# Patient Record
Sex: FEMALE | Race: WHITE | Employment: STUDENT | URBAN - METROPOLITAN AREA
[De-identification: names, ages, dates, MRNs, and addresses within clinical notes are randomized per-mention and may not be internally consistent; named-entity substitution may affect disease eponyms.]

---

## 2017-06-29 ENCOUNTER — TELEPHONE (OUTPATIENT)
Dept: OBGYN CLINIC | Age: 37
End: 2017-06-29

## 2017-06-29 RX ORDER — LEVONORGESTREL AND ETHINYL ESTRADIOL 0.1-0.02MG
1 KIT ORAL DAILY
Qty: 84 TAB | Refills: 0 | Status: SHIPPED | OUTPATIENT
Start: 2017-06-29 | End: 2017-10-16 | Stop reason: SDUPTHER

## 2017-07-06 ENCOUNTER — TELEPHONE (OUTPATIENT)
Dept: OBGYN CLINIC | Age: 37
End: 2017-07-06

## 2017-07-06 NOTE — TELEPHONE ENCOUNTER
Patient calling stating that she stopped her OCP's x1 week ago as she wanted to take a break and see if she could loose weight. Last night, she had IC and the condom slipped off and semen spilled everywhere and patient was concerned that some may have spilled into the vagina and took plan B today. Patient has decided that she wants to go back on the OCP's. Please advise when patient should start.

## 2017-07-28 ENCOUNTER — OFFICE VISIT (OUTPATIENT)
Dept: FAMILY MEDICINE CLINIC | Age: 37
End: 2017-07-28

## 2017-07-28 DIAGNOSIS — Z11.1 ENCOUNTER FOR PPD TEST: Primary | ICD-10-CM

## 2017-07-28 NOTE — PROGRESS NOTES
PPD 0.1ml placed in left forearm for employment requirement. Will return in 3 days for reading. Pt states understanding.   Verbal order given by Radha Kuhn NP.

## 2017-07-31 ENCOUNTER — OFFICE VISIT (OUTPATIENT)
Dept: FAMILY MEDICINE CLINIC | Age: 37
End: 2017-07-31

## 2017-07-31 DIAGNOSIS — Z11.1 SCREENING-PULMONARY TB: Primary | ICD-10-CM

## 2017-07-31 LAB
MM INDURATION POC: 0 MM (ref 0–5)
PPD POC: NORMAL NEGATIVE

## 2017-08-11 ENCOUNTER — OFFICE VISIT (OUTPATIENT)
Dept: FAMILY MEDICINE CLINIC | Age: 37
End: 2017-08-11

## 2017-08-11 VITALS
WEIGHT: 177 LBS | SYSTOLIC BLOOD PRESSURE: 118 MMHG | BODY MASS INDEX: 27.78 KG/M2 | DIASTOLIC BLOOD PRESSURE: 74 MMHG | HEIGHT: 67 IN | TEMPERATURE: 98 F | HEART RATE: 72 BPM | RESPIRATION RATE: 16 BRPM | OXYGEN SATURATION: 99 %

## 2017-08-11 DIAGNOSIS — M25.50 ARTHRALGIA, UNSPECIFIED JOINT: Primary | ICD-10-CM

## 2017-08-11 RX ORDER — PREDNISONE 20 MG/1
20 TABLET ORAL 2 TIMES DAILY
Qty: 20 TAB | Refills: 0 | Status: SHIPPED | OUTPATIENT
Start: 2017-08-11 | End: 2017-08-25 | Stop reason: SDUPTHER

## 2017-08-11 NOTE — PROGRESS NOTES
HISTORY OF PRESENT ILLNESS  Aneta Oviedo is a 30 y.o. female. HPI  C/O worsening joint pains. Last seen Jan 2016 and at that time referred to Rheumatology. She was not able to go to appt due to son having concussion. CRP was elevated but RF and JONH negative then. Pain quieted down for a while but worsened in last 6 months. Now involving wrists, elbows, as well as knees, hip, back, shoulders. She takes  mg three to four times daily to move. Right wrist is swollen. She is finishing up nursing school. Review of Systems   Gastrointestinal: Positive for abdominal pain. Musculoskeletal: Positive for back pain and joint pain. All other systems reviewed and are negative. Visit Vitals    /74 (BP 1 Location: Left arm, BP Patient Position: Sitting)    Pulse 72    Temp 98 °F (36.7 °C) (Oral)    Resp 16    Ht 5' 7\" (1.702 m)    Wt 177 lb (80.3 kg)    LMP 07/20/2017    SpO2 99%    BMI 27.72 kg/m2       Physical Exam   Constitutional: She appears well-developed and well-nourished. Pulmonary/Chest:       Left breast axillary tail, no mass. Mild tender. No adenopathy. Musculoskeletal:        Right wrist: She exhibits tenderness and swelling. Vitals reviewed. ASSESSMENT and PLAN    ICD-10-CM ICD-9-CM    1. Arthralgia, unspecified joint M25.50 719.40 C REACTIVE PROTEIN, QT      REFERRAL TO RHEUMATOLOGY      predniSONE (DELTASONE) 20 mg tablet      SED RATE (ESR)      CBC WITH AUTOMATED DIFF      LYME AB, IGG & IGM BY WB      RHEUMATOID FACTOR, QL      JONH QL, W/REFLEX CASCADE     Recheck labs. Trial of prednisone. Warning about possible side effects. Need Rheumatology evaluation. Suggest taking Zantac or Prilosec OTC for stomach protection. Patient Instructions        Oral Corticosteroids: Care Instructions  Your Care Instructions  Oral corticosteroids are commonly used medicines. They help calm down the body's response to inflammation.  Oral means that they are taken by mouth. This is most often in the form of a pill. They are used for treating many conditions. You may take them for asthma, COPD, back pain, or allergic reactions. They are also used for other conditions such as autoimmune diseases and certain types of cancer. You may have side effects from taking this medicine. These include nausea, headache, dizziness, and anxiety. Pregnant women should not take this medicine unless their doctor tells them to. Follow your doctor's instructions on how to take this medicine. If you are taking it for 2 weeks or more, your doctor may give you special instructions to slowly reduce (taper) the amount you take. Slowly cutting down on the medicine over time helps your body adjust to the change. Follow-up care is a key part of your treatment and safety. Be sure to make and go to all appointments, and call your doctor if you are having problems. It's also a good idea to know your test results and keep a list of the medicines you take. How can you care for yourself at home? · Be safe with medicines. Take your medicines exactly as prescribed. Call your doctor if you think you are having a problem with your medicine. You will get more details on the specific medicines your doctor prescribes. · Take your medicine after a meal. It may cause nausea if you take it on an empty stomach. · Avoid taking nonsteroidal anti-inflammatory drugs (NSAIDs) while you are taking oral corticosteroids. Taking both of these medicines might cause an upset stomach. NSAIDs include ibuprofen (Advil, Motrin) and naproxen (Aleve). · If you have a history of stomach ulcers, you may want to avoid taking this medicine and an NSAID at the same time. This can cause stomach upset or bleeding. · Follow your doctor's instructions for how to stop taking this medicine. You may need to taper it. This means the medicine should be slowly reduced. Do not stop taking the medicine all at once.   When should you call for help?  Call 911 if:  · You vomit blood or what looks like coffee grounds. Call your doctor now or seek immediate medical care if:  · Your symptoms are getting worse. · You are dizzy or lightheaded, or you feel like you may faint. · You have new or worse nausea or vomiting. · You have stomach pain that is getting worse. · Your stools are black. Watch closely for changes in your health, and be sure to contact your doctor if:  · You do not get better as expected. Where can you learn more? Go to http://jazmín-andres.info/. Enter Y480 in the search box to learn more about \"Oral Corticosteroids: Care Instructions. \"  Current as of: March 25, 2017  Content Version: 11.3  © 2233-8136 [employer]. Care instructions adapted under license by [employer] (which disclaims liability or warranty for this information). If you have questions about a medical condition or this instruction, always ask your healthcare professional. [place] [address] any warranty or liability for your use of this information.

## 2017-08-11 NOTE — PATIENT INSTRUCTIONS
Oral Corticosteroids: Care Instructions  Your Care Instructions  Oral corticosteroids are commonly used medicines. They help calm down the body's response to inflammation. Oral means that they are taken by mouth. This is most often in the form of a pill. They are used for treating many conditions. You may take them for asthma, COPD, back pain, or allergic reactions. They are also used for other conditions such as autoimmune diseases and certain types of cancer. You may have side effects from taking this medicine. These include nausea, headache, dizziness, and anxiety. Pregnant women should not take this medicine unless their doctor tells them to. Follow your doctor's instructions on how to take this medicine. If you are taking it for 2 weeks or more, your doctor may give you special instructions to slowly reduce (taper) the amount you take. Slowly cutting down on the medicine over time helps your body adjust to the change. Follow-up care is a key part of your treatment and safety. Be sure to make and go to all appointments, and call your doctor if you are having problems. It's also a good idea to know your test results and keep a list of the medicines you take. How can you care for yourself at home? · Be safe with medicines. Take your medicines exactly as prescribed. Call your doctor if you think you are having a problem with your medicine. You will get more details on the specific medicines your doctor prescribes. · Take your medicine after a meal. It may cause nausea if you take it on an empty stomach. · Avoid taking nonsteroidal anti-inflammatory drugs (NSAIDs) while you are taking oral corticosteroids. Taking both of these medicines might cause an upset stomach. NSAIDs include ibuprofen (Advil, Motrin) and naproxen (Aleve). · If you have a history of stomach ulcers, you may want to avoid taking this medicine and an NSAID at the same time. This can cause stomach upset or bleeding.   · Follow your doctor's instructions for how to stop taking this medicine. You may need to taper it. This means the medicine should be slowly reduced. Do not stop taking the medicine all at once. When should you call for help? Call 911 if:  · You vomit blood or what looks like coffee grounds. Call your doctor now or seek immediate medical care if:  · Your symptoms are getting worse. · You are dizzy or lightheaded, or you feel like you may faint. · You have new or worse nausea or vomiting. · You have stomach pain that is getting worse. · Your stools are black. Watch closely for changes in your health, and be sure to contact your doctor if:  · You do not get better as expected. Where can you learn more? Go to http://jazmín-andres.info/. Enter H518 in the search box to learn more about \"Oral Corticosteroids: Care Instructions. \"  Current as of: March 25, 2017  Content Version: 11.3  © 1144-2919 [employer]. Care instructions adapted under license by [employer] (which disclaims liability or warranty for this information). If you have questions about a medical condition or this instruction, always ask your healthcare professional. [place] [address] any warranty or liability for your use of this information.

## 2017-08-11 NOTE — PROGRESS NOTES
Chief Complaint   Patient presents with    Pain (Chronic)     pt states she has pain all over body. now has moved into right wrist.  never really goes away     \"REVIEWED RECORD IN PREPARATION FOR VISIT AND HAVE OBTAINED THE NECESSARY DOCUMENTATION\"  1. Have you been to the ER, urgent care clinic since your last visit? Hospitalized since your last visit? Yes Where: seen at [facility] for swollen lymph nodes    2. Have you seen or consulted any other health care providers outside of the [address] [place] [address] since your last visit? Include any pap smears or colon screening. No  Patient does not have advanced directives.

## 2017-08-11 NOTE — MR AVS SNAPSHOT
Visit Information Date & Time Provider Department Dept. Phone Encounter #  
 1/53/6500  3:14 AM Kanwal MariaOctavio [phone] [id number] Your Appointments 8/23/2017  2:20 PM  
MAMMOGRAPHY with MAMMOGRAM, MANISHA Boyle ([address]) Appt Note: AE/MAMMO HW pt  
 [address] [place] [address]  
[phone]  
  
   
 [address]  
  
    
 8/23/2017  2:40 PM  
ESTABLISHED PATIENT with MD Frederick Wyatt [place] ([address]) Appt Note: AE/MAMMO HW pt  
 [address] [facility] [address]  
[facility] [address] Upcoming Health Maintenance Date Due DTaP/Tdap/Td series (1 - Tdap) 1/17/2003 INFLUENZA AGE 9 TO ADULT 8/1/2017 PAP AKA CERVICAL CYTOLOGY 7/1/2019 Allergies as of 8/11/2017  Review Complete On: 3/27/9942 By: Kanwal Maria MD  
  
 Severity Noted Reaction Type Reactions Doxycycline  08/13/2015   Side Effect Other (comments) esophagitis Iodinated Contrast- Oral And Iv Dye  04/03/2014    Unknown (comments) Pt states she passed out. Morphine  04/03/2014    Rash, Itching Tramadol  04/03/2014    Rash, Itching Current Immunizations  Reviewed on 7/28/2017 Name Date  
 TB Skin Test (PPD) Intradermal 7/28/2017  9:33 AM, 7/25/2016, 7/13/2016, 8/24/2015, 8/14/2015 Not reviewed this visit You Were Diagnosed With   
  
 Codes Comments Arthralgia, unspecified joint    -  Primary ICD-10-CM: M25.50 ICD-9-CM: 719.40 Vitals BP Pulse Temp Resp Height(growth percentile) Weight(growth percentile) 118/74 (BP 1 Location: Left arm, BP Patient Position: Sitting) 72 98 °F (36.7 °C) (Oral) 16 5' 7\" (1.702 m) 177 lb (80.3 kg) LMP SpO2 BMI OB Status Smoking Status 07/20/2017 99% 27.72 kg/m2 Having regular periods Former Smoker BMI and BSA Data Body Mass Index Body Surface Area  
 27.72 kg/m 2 1.95 m 2 Preferred Pharmacy Pharmacy Name Phone [facility] [phone] Your Updated Medication List  
  
   
This list is accurate as of: 8/11/17 10:37 AM.  Always use your most recent med list.  
  
  
  
  
 ethinyl estradiol-etonogestrel 0.12-0.015 mg/24 hr vaginal ring Commonly known as:  Danielle Senior Insert ring vaginally for four weeks, remove for four days. Replace with new ring. ibuprofen 200 mg tablet Commonly known as:  MOTRIN Take 800 mg by mouth daily. levonorgestrel-ethinyl estradiol 0.1-20 mg-mcg Tab Commonly known as:  Otismegan Sales (28) Take 1 Tab by mouth daily. predniSONE 20 mg tablet Commonly known as:  Sendy Gonzales Take 1 Tab by mouth two (2) times a day for 10 days. Prescriptions Sent to Pharmacy Refills  
 predniSONE (DELTASONE) 20 mg tablet 0 Sig: Take 1 Tab by mouth two (2) times a day for 10 days. Class: Normal  
 Pharmacy: [id number] [place] [address] Ph #: [phone] Route: Oral  
  
We Performed the Following JONH QL, W/REFLEX CASCADE [[id number] Custom] C REACTIVE PROTEIN, QT [87004 CPT(R)] CBC WITH AUTOMATED DIFF [74547 CPT(R)] LYME AB, IGG & IGM BY WB [34905 CPT(R)] REFERRAL TO RHEUMATOLOGY [CKZ83 Custom] Comments:  
 Please evaluate patient for diffuse joint pain that limits acitivity. RHEUMATOID FACTOR, QL [id number] CPT(R)] SED RATE (ESR) [id number] CPT(R)] Referral Information Referral ID Referred By Referred To  
  
 [id number] Carline MARSHALL MD   
   [address] [place] [address], [address] [place] [address] Phone: [phone] Fax: [phone] Visits Status Start Date End Date 1 New Request 8/11/17 8/11/18 If your referral has a status of pending review or denied, additional information will be sent to support the outcome of this decision. Patient Instructions Oral Corticosteroids: Care Instructions Your Care Instructions Oral corticosteroids are commonly used medicines. They help calm down the body's response to inflammation. Oral means that they are taken by mouth. This is most often in the form of a pill. They are used for treating many conditions. You may take them for asthma, COPD, back pain, or allergic reactions. They are also used for other conditions such as autoimmune diseases and certain types of cancer. You may have side effects from taking this medicine. These include nausea, headache, dizziness, and anxiety. Pregnant women should not take this medicine unless their doctor tells them to. Follow your doctor's instructions on how to take this medicine. If you are taking it for 2 weeks or more, your doctor may give you special instructions to slowly reduce (taper) the amount you take. Slowly cutting down on the medicine over time helps your body adjust to the change. Follow-up care is a key part of your treatment and safety. Be sure to make and go to all appointments, and call your doctor if you are having problems. It's also a good idea to know your test results and keep a list of the medicines you take. How can you care for yourself at home? · Be safe with medicines. Take your medicines exactly as prescribed. Call your doctor if you think you are having a problem with your medicine. You will get more details on the specific medicines your doctor prescribes. · Take your medicine after a meal. It may cause nausea if you take it on an empty stomach. · Avoid taking nonsteroidal anti-inflammatory drugs (NSAIDs) while you are taking oral corticosteroids.  Taking both of these medicines might cause an upset stomach. NSAIDs include ibuprofen (Advil, Motrin) and naproxen (Aleve). · If you have a history of stomach ulcers, you may want to avoid taking this medicine and an NSAID at the same time. This can cause stomach upset or bleeding. · Follow your doctor's instructions for how to stop taking this medicine. You may need to taper it. This means the medicine should be slowly reduced. Do not stop taking the medicine all at once. When should you call for help? Call 911 if: 
· You vomit blood or what looks like coffee grounds. Call your doctor now or seek immediate medical care if: 
· Your symptoms are getting worse. · You are dizzy or lightheaded, or you feel like you may faint. · You have new or worse nausea or vomiting. · You have stomach pain that is getting worse. · Your stools are black. Watch closely for changes in your health, and be sure to contact your doctor if: 
· You do not get better as expected. Where can you learn more? Go to http://jazmín-andres.info/. Enter M786 in the search box to learn more about \"Oral Corticosteroids: Care Instructions. \" Current as of: March 25, 2017 Content Version: 11.3 © 5382-9987 [email or web address]. Care instructions adapted under license by [employer] (which disclaims liability or warranty for this information). If you have questions about a medical condition or this instruction, always ask your healthcare professional. [place] [address] any warranty or liability for your use of this information. Introducing [facility]! Mary Garcia introduces [email or web address] patient portal. Now you can access parts of your medical record, email your doctor's office, and request medication refills online. 1. In your internet browser, go to [email or web address] 2. Click on the First Time User? Click Here link in the Sign In box. You will see the New Member Sign Up page. 3. Enter your [email or web address] Access Code exactly as it appears below. You will not need to use this code after youve completed the sign-up process. If you do not sign up before the expiration date, you must request a new code. · [email or web address] Access Code: [email or web address] Expires: 10/26/2017  9:38 AM 
 
4. Enter the last four digits of your Social Security Number (xxxx) and Date of Birth (mm/dd/yyyy) as indicated and click Submit. You will be taken to the next sign-up page. 5. Create a [email or web address] ID. This will be your [email or web address] login ID and cannot be changed, so think of one that is secure and easy to remember. 6. Create a [email or web address] password. You can change your password at any time. 7. Enter your Password Reset Question and Answer. This can be used at a later time if you forget your password. 8. Enter your e-mail address. You will receive e-mail notification when new information is available in [address] 9. Click Sign Up. You can now view and download portions of your medical record. 10. Click the Download Summary menu link to download a portable copy of your medical information. If you have questions, please visit the Frequently Asked Questions section of the [email or web address] website. Remember, [email or web address] is NOT to be used for urgent needs. For medical emergencies, dial 911. Now available from your iPhone and Android! Please provide this summary of care documentation to your next provider. Lyme Disease Testing Disclaimer:   
 § 69.0-2492.5. (Expires July 1, 2018) Lyme disease testing information disclosure. A. Every licensee or his in-office designee who orders a laboratory test for the presence of Lyme disease shall provide to the patient or his legal representative the following written information: \"ACCORDING TO THE CENTERS FOR DISEASE CONTROL AND PREVENTION, AS OF 2011 LYME DISEASE IS THE SIXTH FASTEST GROWING DISEASE IN THE [place]. YOUR HEALTH CARE PROVIDER HAS ORDERED A LABORATORY TEST FOR THE PRESENCE OF LYME DISEASE FOR YOU. CURRENT LABORATORY TESTING FOR LYME DISEASE CAN BE PROBLEMATIC AND STANDARD LABORATORY TESTS OFTEN RESULT IN FALSE NEGATIVE AND FALSE POSITIVE RESULTS, AND IF DONE TOO EARLY, YOU MAY NOT HAVE PRODUCED ENOUGH ANTIBODIES TO BE CONSIDERED POSITIVE BECAUSE YOUR IMMUNE RESPONSE REQUIRES TIME TO DEVELOP ANTIBODIES. IF YOU ARE TESTED FOR LYME DISEASE, AND THE RESULTS ARE NEGATIVE, THIS DOES NOT NECESSARILY MEAN YOU DO NOT HAVE LYME DISEASE. IF YOU CONTINUE TO EXPERIENCE SYMPTOMS, YOU SHOULD CONTACT YOUR HEALTH CARE PROVIDER AND INQUIRE ABOUT THE APPROPRIATENESS OF RETESTING OR ADDITIONAL TREATMENT. \"  
B. Licensees shall be immune from civil liability for the provision of the written information required by this section absent gross negligence or willful misconduct. Your primary care clinician is listed as Ellen Pontiff. If you have any questions after today's visit, please call [phone].

## 2017-08-15 ENCOUNTER — CLINICAL SUPPORT (OUTPATIENT)
Dept: FAMILY MEDICINE CLINIC | Age: 37
End: 2017-08-15

## 2017-08-15 DIAGNOSIS — Z23 ENCOUNTER FOR IMMUNIZATION: ICD-10-CM

## 2017-08-15 DIAGNOSIS — Z11.1 SCREENING EXAMINATION FOR PULMONARY TUBERCULOSIS: ICD-10-CM

## 2017-08-15 LAB
ANA SER QL: NEGATIVE
B BURGDOR IGG PATRN SER IB-IMP: NEGATIVE
B BURGDOR IGM PATRN SER IB-IMP: NEGATIVE
B BURGDOR18KD IGG SER QL IB: ABNORMAL
B BURGDOR23KD IGG SER QL IB: ABNORMAL
B BURGDOR23KD IGM SER QL IB: ABNORMAL
B BURGDOR28KD IGG SER QL IB: ABNORMAL
B BURGDOR30KD IGG SER QL IB: ABNORMAL
B BURGDOR39KD IGG SER QL IB: ABNORMAL
B BURGDOR39KD IGM SER QL IB: ABNORMAL
B BURGDOR41KD IGG SER QL IB: PRESENT
B BURGDOR41KD IGM SER QL IB: ABNORMAL
B BURGDOR45KD IGG SER QL IB: ABNORMAL
B BURGDOR58KD IGG SER QL IB: ABNORMAL
B BURGDOR66KD IGG SER QL IB: ABNORMAL
B BURGDOR93KD IGG SER QL IB: ABNORMAL
BASOPHILS # BLD AUTO: 0 X10E3/UL (ref 0–0.2)
BASOPHILS NFR BLD AUTO: 1 %
CRP SERPL-MCNC: 7.2 MG/L (ref 0–4.9)
EOSINOPHIL # BLD AUTO: 0.1 X10E3/UL (ref 0–0.4)
EOSINOPHIL NFR BLD AUTO: 4 %
ERYTHROCYTE [DISTWIDTH] IN BLOOD BY AUTOMATED COUNT: 13.1 % (ref 12.3–15.4)
ERYTHROCYTE [SEDIMENTATION RATE] IN BLOOD BY WESTERGREN METHOD: 2 MM/HR (ref 0–32)
HCT VFR BLD AUTO: 39 % (ref 34–46.6)
HGB BLD-MCNC: 12.9 G/DL (ref 11.1–15.9)
IMM GRANULOCYTES # BLD: 0 X10E3/UL (ref 0–0.1)
IMM GRANULOCYTES NFR BLD: 0 %
LYMPHOCYTES # BLD AUTO: 1.5 X10E3/UL (ref 0.7–3.1)
LYMPHOCYTES NFR BLD AUTO: 39 %
MCH RBC QN AUTO: 31.3 PG (ref 26.6–33)
MCHC RBC AUTO-ENTMCNC: 33.1 G/DL (ref 31.5–35.7)
MCV RBC AUTO: 95 FL (ref 79–97)
MONOCYTES # BLD AUTO: 0.3 X10E3/UL (ref 0.1–0.9)
MONOCYTES NFR BLD AUTO: 7 %
NEUTROPHILS # BLD AUTO: 2 X10E3/UL (ref 1.4–7)
NEUTROPHILS NFR BLD AUTO: 49 %
PLATELET # BLD AUTO: 257 X10E3/UL (ref 150–379)
RBC # BLD AUTO: 4.12 X10E6/UL (ref 3.77–5.28)
RHEUMATOID FACT SERPL-ACNC: <10 IU/ML (ref 0–13.9)
SEE BELOW:, 164879: NORMAL
WBC # BLD AUTO: 4 X10E3/UL (ref 3.4–10.8)

## 2017-08-15 NOTE — PROGRESS NOTES
Identified pt with two pt identifiers(name and ). Chief Complaint   Patient presents with    PPD Reading     placement        Health Maintenance Due   Topic    DTaP/Tdap/Td series (1 - Tdap)    INFLUENZA AGE 9 TO ADULT        Wt Readings from Last 3 Encounters:   17 177 lb (80.3 kg)   16 177 lb (80.3 kg)   16 174 lb (78.9 kg)     Temp Readings from Last 3 Encounters:   17 98 °F (36.7 °C) (Oral)   16 98.6 °F (37 °C)   16 97.6 °F (36.4 °C) (Oral)     BP Readings from Last 3 Encounters:   17 118/74   16 128/69   16 122/74     Pulse Readings from Last 3 Encounters:   17 72   16 88   16 94         Learning Assessment:  :     Learning Assessment 2015   PRIMARY LEARNER Patient   HIGHEST LEVEL OF EDUCATION - PRIMARY LEARNER  > 4 YEARS OF COLLEGE   BARRIERS PRIMARY LEARNER NONE   CO-LEARNER CAREGIVER No   PRIMARY LANGUAGE ENGLISH   LEARNER PREFERENCE PRIMARY PICTURES   ANSWERED BY self   RELATIONSHIP SELF       Depression Screening:  :     PHQ over the last two weeks 2017   Little interest or pleasure in doing things Not at all   Feeling down, depressed or hopeless Not at all   Total Score PHQ 2 0       Fall Risk Assessment:  :     No flowsheet data found. Abuse Screening:  :     Abuse Screening Questionnaire 2015   Do you ever feel afraid of your partner? N   Are you in a relationship with someone who physically or mentally threatens you? N   Is it safe for you to go home? Y       Coordination of Care Questionnaire:  :     1) Have you been to an emergency room, urgent care clinic since your last visit? no   Hospitalized since your last visit? no             2) Have you seen or consulted any other health care providers outside of [address] [place] [address] since your last visit? no  (Include any pap smears or colon screenings in this section.)    3) Do you have an Advance Directive on file?  no  Are you interested in receiving information about Advance Directives? no    Patient is accompanied by daughter I have received verbal consent from Garrick Mesa to discuss any/all medical information while they are present in the room. Reviewed record in preparation for visit and have obtained necessary documentation. Medication reconciliation up to date and corrected with patient at this time.

## 2017-08-17 ENCOUNTER — TELEPHONE (OUTPATIENT)
Dept: FAMILY MEDICINE CLINIC | Age: 37
End: 2017-08-17

## 2017-08-17 ENCOUNTER — CLINICAL SUPPORT (OUTPATIENT)
Dept: FAMILY MEDICINE CLINIC | Age: 37
End: 2017-08-17

## 2017-08-17 DIAGNOSIS — Z11.1 PPD SCREENING TEST: Primary | ICD-10-CM

## 2017-08-17 LAB
MM INDURATION POC: 0 MM (ref 0–5)
PPD POC: NORMAL NEGATIVE

## 2017-08-17 NOTE — TELEPHONE ENCOUNTER
Lab tests for Rheumatoid arthritis and Lupus are both negative. CRP level is still a little elevated. Lyme test is negative. I hope you have heard from the Rheumatology Clinic for an appointment. They called but wanted her to come in Monday and she could not. The nurse said she would call back and did not. I gave her the phone number and told her to call. She is scared to stop the prednisone.

## 2017-08-17 NOTE — PROGRESS NOTES
PPD Reading Note  PPD read and results entered in [email or web address]. Result: 0 mm induration. Interpretation: neg  If test not read within 48-72 hours of initial placement, patient advised to repeat in other arm 1-3 weeks after this test.  Allergic reaction: no      Identified pt with two pt identifiers(name and ). Chief Complaint   Patient presents with    PPD Reading        Health Maintenance Due   Topic    DTaP/Tdap/Td series (1 - Tdap)    INFLUENZA AGE 9 TO ADULT        Wt Readings from Last 3 Encounters:   17 177 lb (80.3 kg)   16 177 lb (80.3 kg)   16 174 lb (78.9 kg)     Temp Readings from Last 3 Encounters:   17 98 °F (36.7 °C) (Oral)   16 98.6 °F (37 °C)   16 97.6 °F (36.4 °C) (Oral)     BP Readings from Last 3 Encounters:   17 118/74   16 128/69   16 122/74     Pulse Readings from Last 3 Encounters:   17 72   16 88   16 94         Learning Assessment:  :     Learning Assessment 2015   PRIMARY LEARNER Patient   HIGHEST LEVEL OF EDUCATION - PRIMARY LEARNER  > 4 YEARS OF COLLEGE   BARRIERS PRIMARY LEARNER NONE   CO-LEARNER CAREGIVER No   PRIMARY LANGUAGE ENGLISH   LEARNER PREFERENCE PRIMARY PICTURES   ANSWERED BY self   RELATIONSHIP SELF       Depression Screening:  :     PHQ over the last two weeks 2017   Little interest or pleasure in doing things Not at all   Feeling down, depressed or hopeless Not at all   Total Score PHQ 2 0       Fall Risk Assessment:  :     No flowsheet data found. Abuse Screening:  :     Abuse Screening Questionnaire 2015   Do you ever feel afraid of your partner? N   Are you in a relationship with someone who physically or mentally threatens you? N   Is it safe for you to go home?  Y       Coordination of Care Questionnaire:  :     1) Have you been to an emergency room, urgent care clinic since your last visit? no   Hospitalized since your last visit? no             2) Have you seen or consulted any other health care providers outside of [address] [place] [address] since your last visit? no  (Include any pap smears or colon screenings in this section.)    3) Do you have an Advance Directive on file? no  Are you interested in receiving information about Advance Directives? no    Reviewed record in preparation for visit and have obtained necessary documentation. Medication reconciliation up to date and corrected with patient at this time.

## 2017-08-24 ENCOUNTER — TELEPHONE (OUTPATIENT)
Dept: FAMILY MEDICINE CLINIC | Age: 37
End: 2017-08-24

## 2017-08-24 DIAGNOSIS — M25.50 ARTHRALGIA, UNSPECIFIED JOINT: ICD-10-CM

## 2017-08-24 NOTE — TELEPHONE ENCOUNTER
Patient called to see if Sky Janessa could call her back, she has a question and did not disclose information.

## 2017-08-25 ENCOUNTER — OFFICE VISIT (OUTPATIENT)
Dept: OBGYN CLINIC | Age: 37
End: 2017-08-25

## 2017-08-25 DIAGNOSIS — Z12.31 ENCOUNTER FOR SCREENING MAMMOGRAM FOR MALIGNANT NEOPLASM OF BREAST: Primary | ICD-10-CM

## 2017-08-25 RX ORDER — PREDNISONE 20 MG/1
20 TABLET ORAL 2 TIMES DAILY
Qty: 20 TAB | Refills: 0 | Status: SHIPPED | OUTPATIENT
Start: 2017-08-25 | End: 2017-09-04

## 2017-08-25 NOTE — TELEPHONE ENCOUNTER
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  Please call and advise pt

## 2017-08-30 ENCOUNTER — OFFICE VISIT (OUTPATIENT)
Dept: RHEUMATOLOGY | Age: 37
End: 2017-08-30

## 2017-08-30 VITALS
BODY MASS INDEX: 30.26 KG/M2 | DIASTOLIC BLOOD PRESSURE: 84 MMHG | RESPIRATION RATE: 18 BRPM | HEIGHT: 67 IN | OXYGEN SATURATION: 98 % | SYSTOLIC BLOOD PRESSURE: 125 MMHG | TEMPERATURE: 98.6 F | HEART RATE: 83 BPM | WEIGHT: 192.8 LBS

## 2017-08-30 DIAGNOSIS — M47.819 SPONDYLARTHRITIS: Primary | ICD-10-CM

## 2017-08-30 RX ORDER — NAPROXEN 500 MG/1
500 TABLET ORAL 2 TIMES DAILY WITH MEALS
Qty: 60 TAB | Refills: 6 | Status: SHIPPED | OUTPATIENT
Start: 2017-08-30 | End: 2017-09-29

## 2017-08-30 RX ORDER — RANITIDINE 150 MG/1
150 TABLET, FILM COATED ORAL 2 TIMES DAILY
Qty: 60 TAB | Refills: 6 | Status: SHIPPED | OUTPATIENT
Start: 2017-08-30 | End: 2017-09-29 | Stop reason: ALTCHOICE

## 2017-08-30 NOTE — MR AVS SNAPSHOT
Visit Information Date & Time Provider Department Dept. Phone Encounter #  
 8/30/2017  1:00 PM Vielka Locke MD [address] [phone] [id number] Follow-up Instructions Return in about 2 weeks (around 9/13/2017). Your Appointments 8/31/2017  1:20 PM  
ESTABLISHED PAT[facility] with [facility] Appt Note: Annual Exam  
 [facility] [address] [facility] [phone]  
[address] [place] [address] Upcoming Health Maintenance Date Due DTaP/Tdap/Td series (1 - Tdap) 1/17/2003 INFLUENZA AGE 9 TO ADULT 8/1/2017 PAP AKA CERVICAL CYTOLOGY 7/1/2019 Allergies as of 8/30/2017  Review Complete On: 8/30/2017 By: Landry Grimes LPN Severity Noted Reaction Type Reactions Doxycycline  08/13/2015   Side Effect Other (comments) esophagitis Iodinated Contrast- Oral And Iv Dye  04/03/2014    Unknown (comments) Pt states she passed out. Morphine  04/03/2014    Rash, Itching Tramadol  04/03/2014    Rash, Itching Current Immunizations  Reviewed on 7/28/2017 Name Date  
 TB Skin Test (PPD) Intradermal  Incomplete, 7/28/2017  9:33 AM, 7/25/2016, 7/13/2016, 8/24/2015, 8/14/2015 Not reviewed this visit You Were Diagnosed With   
  
 Codes Comments Spondylarthritis ([facility].)    -  Primary ICD-10-CM: M46.90 ICD-9-CM: 721.90 Vitals BP Pulse Temp Resp Height(growth percentile) Weight(growth percentile) 125/84 (BP 1 Location: Right arm, BP Patient Position: Sitting) 83 98.6 °F (37 °C) (Oral) 18 5' 7\" (1.702 m) 192 lb 12.8 oz (87.5 kg) LMP SpO2 BMI OB Status Smoking Status 08/23/2017 98% 30.2 kg/m2 Having regular periods Former Smoker Vitals History BMI and BSA Data Body Mass Index Body Surface Area  
 30.2 kg/m 2 2.03 m 2 Preferred Pharmacy Pharmacy Name Phone [facility], [address] [phone] Your Updated Medication List  
  
   
This list is accurate as of: 8/30/17  1:45 PM.  Always use your most recent med list.  
  
  
  
  
 ethinyl estradiol-etonogestrel 0.12-0.015 mg/24 hr vaginal ring Commonly known as:  Chucky Elizabeth Insert ring vaginally for four weeks, remove for four days. Replace with new ring. ibuprofen 200 mg tablet Commonly known as:  MOTRIN Take 800 mg by mouth daily. levonorgestrel-ethinyl estradiol 0.1-20 mg-mcg Tab Commonly known as:  Pattricia Stain (28) Take 1 Tab by mouth daily. naproxen 500 mg tablet Commonly known as:  NAPROSYN Take 1 Tab by mouth two (2) times daily (with meals) for 30 days. predniSONE 20 mg tablet Commonly known as:  Edrie Power Take 1 Tab by mouth two (2) times a day for 10 days. raNITIdine 150 mg tablet Commonly known as:  ZANTAC Take 1 Tab by mouth two (2) times a day. TYLENOL PO Take 1,000 mg by mouth. Prescriptions Sent to Pharmacy Refills  
 naproxen (NAPROSYN) 500 mg tablet 6 Sig: Take 1 Tab by mouth two (2) times daily (with meals) for 30 days. Class: Normal  
 [id number] [address] [place] [address] Ph #: [phone] Route: Oral  
 raNITIdine (ZANTAC) 150 mg tablet 6 Sig: Take 1 Tab by mouth two (2) times a day. Class: Normal  
 [id number] [address] [place] [address] Ph #: [phone] Route: Oral  
  
We Performed the Following [address], IGG F8531205 CPT(R)] HCV AB W/RFLX TO MERON [42574 CPT(R)] HEP B SURFACE AG [id number] CPT(R)] HEPATITIS B CORE AB, TOTAL [id number] CPT(R)] HEPATITIS B SURF AB QUANT [id number] CPT(R)] HLA-B27 [id number] CPT(R)] QUANTIFERON TB GOLD [[id number] Custom] Follow-up Instructions Return in about 2 weeks (around 9/13/2017). To-Do List   
 08/30/2017 Imaging:  XR FOOT LT MIN 3 V   
  
 08/30/2017 Imaging:  XR FOOT RT MIN 3 V   
  
 08/30/2017 Imaging:  XR HAND LT MIN 3 V   
  
 08/30/2017 Imaging:  XR HAND RT MIN 3 V   
  
 08/30/2017 Imaging:  XR KNEE LT MAX 2 VWS   
  
 08/30/2017 Imaging:  XR KNEE RT MAX 2 VWS   
  
 08/30/2017 Imaging:  XR SI JTS MIN 3 V   
  
 08/31/2017 Imaging:  MRI PELV W WO CONT Introducing [facility]! Matias Oliver introduces [employer] patient portal. Now you can access parts of your medical record, email your doctor's office, and request medication refills online. 1. In your internet browser, go to [email or web address] 2. Click on the First Time User? Click Here link in the Sign In box. You will see the New Member Sign Up page. 3. Enter your [employer] Access Code exactly as it appears below. You will not need to use this code after youve completed the sign-up process. If you do not sign up before the expiration date, you must request a new code. · [employer] Access Code: [email or web address] Expires: 10/26/2017  9:38 AM 
 
4. Enter the last four digits of your Social Security Number (xxxx) and Date of Birth (mm/dd/yyyy) as indicated and click Submit. You will be taken to the next sign-up page. 5. Create a [employer] ID. This will be your [employer] login ID and cannot be changed, so think of one that is secure and easy to remember. 6. Create a [employer] password. You can change your password at any time. 7. Enter your Password Reset Question and Answer. This can be used at a later time if you forget your password. 8. Enter your e-mail address. You will receive e-mail notification when new information is available in [address] 9. Click Sign Up. You can now view and download portions of your medical record.  
10. Click the Download Summary menu link to download a portable copy of your medical information. If you have questions, please visit the Frequently Asked Questions section of the [employer] website. Remember, [employer] is NOT to be used for urgent needs. For medical emergencies, dial 911. Now available from your iPhone and Android! Please provide this summary of care documentation to your next provider. Your primary care clinician is listed as Otis Romano. If you have any questions after today's visit, please call [phone].

## 2017-08-30 NOTE — PROGRESS NOTES
CHIEF COMPLAINT  The patient was sent for rheumatology consultation by Dr. Марина Jin MD for evaluation of joint pain    HISTORY OF PRESENT ILLNESS  This is a 30 y.o.  female. Today, the patient complains of pain in the joints. Location: shoulder, elbow, wrist, hip, knee, lumbar spine  Severity:  2 on a scale of 0-10  Timing: all day   Duration:  2 years  Modifying factors: Prednisone  Context/Associated signs and symptoms: The patient complains of joint pain in her knees, shoulders, hips, elbow, and right wrist. Her symptoms first started in 2015 with isolated right hip pain. The episode resolved with Ibuprofen. A few months later, she experienced pain in right shoulder which was again treated with ibuprofen. Starting January 2016, the patient's symptoms have been persistent. Her symptoms began with her right knee and have progressed to include the joints she complains about today: bilateral knees, shoulders, hips, lower back, and her right wrist. She complains of morning stiffness and difficulties sleeping. She mentions pain returns after inactivity for one or more hours. She was originally treated with Prednisone 20 mg BID and Ibuprofen 800 mg up to six times a day. Today, she continues with prednisone 10 mg BID but still complains of symptoms. She also complains of side effects to prednisone. She admits to jaw pain and hair thinning. She denies fevers.      RHEUMATOLOGY REVIEW OF SYSTEMS   Positives as per HPI  Negatives as follows:  Andrew Actis:  Denies unexplained persistent fevers, weight change, chronic fatigue  HEAD/EYES:   Denies eye redness, blurry vision or sudden loss of vision, dry eyes, HA, temporal artery pain  ENT:    Denies oral/nasal ulcers, recurrent sinus infections, dry mouth  RESPIRATORY:  No pleuritic pain, history of pleural effusions, hemoptysis, exertional dyspnea  CARDIOVASCULAR:  Denies chest pain, history of pericardial effusions  GASTRO:   Denies heartburn, esophageal dysmotility, abdominal pain, nausea, vomiting, diarrhea, blood in the stool  HEMATOLOGIC:  No easy bruising, purpura, swollen lymph nodes  SKIN:    Denies alopecia, ulcers, nodules, sun sensitivity, unexplained persistent rash   VASCULAR:   Denies edema, cyanosis, raynaud phenomenon  NEUROLOGIC:  Denies specific muscle weakness, paresthesias   PSYCHIATRIC:  No sleep disturbance / snoring, depression, anxiety  MSK:        MEDICAL AND SOCIAL HISTORY  This was reviewed with the patient and reviewed in the medical records. Past Medical History:   Diagnosis Date    Condyloma     IBS (irritable bowel syndrome)     Pelvic floor dysfunction     Routine Papanicolaou smear 16 neg HPV neg    Screening mammogram for high-risk patient      Past Surgical History:   Procedure Laterality Date    HX APPENDECTOMY      HX  SECTION      HX COLONOSCOPY      Dr. Jesus Kay HX TYMPANOSTOMY       Social History   Substance Use Topics    Smoking status: Former Smoker    Smokeless tobacco: Never Used    Alcohol use 0.6 oz/week     1 Glasses of wine per week     Employment - No history of exposure to asbestos or silica  Sleep - poor quality, pain  Exercise - yes    FAMILY HISTORY  ITP - brother  Crohns, Graves - Aunt    MEDICATIONS  All the current medications were reviewed in detail. PHYSICAL EXAM  Blood pressure 125/84, pulse 83, temperature 98.6 °F (37 °C), temperature source Oral, resp. rate 18, height 5' 7\" (1.702 m), weight 192 lb 12.8 oz (87.5 kg), last menstrual period 2017, SpO2 98 %. GENERAL APPEARANCE: Well-nourished adult in no acute distress. EYES: No scleral erythema, conjunctival injection. ENT: No oral ulcer, parotid enlargement. NECK: No adenopathy, thyroid enlargement. CARDIOVASCULAR: Heart rhythm is regular. No murmur, rub, gallop. CHEST: Normal vesicular breath sounds. No wheezes, rales, pleural friction rubs.   ABDOMINAL: The abdomen is soft and nontender. Liver and spleen are nonpalpable. Bowel sounds are normal.  EXTREMITIES: There is no evidence of clubbing, cyanosis, edema. SKIN: No rash, palpable purpura, digital ulcer, abnormal thickening. NEUROLOGICAL: Normal gait and station, full strength in upper and lower extremities, normal sensation to light touch. MUSCULOSKELETAL:   Upper extremities - Right wrist subtle effusion, subtle warmth. Lower extremities - Right SI joint tenderness. Right knee. Positive Art's Left    LABS, RADIOLOGY AND PROCEDURES  Previous labs reviewed -Yes    Labs Jan 2016  CRP (7.2) - mildly elevated  RF - Negative  JONH Comprehensive panel - Negative    Schober's: 15 cm    Previous radiology reviewed -Yes    SI joint x-rays reviewed today after the visit - no sacroiliitis, mild edema noted. Previous procedures reviewed -Yes  Previous medical records reviewed/summarized -Yes    ASSESSMENT  1. Ankylosing Spondylitis (New problem - Progressive disease) - Her symptoms are consistent with ankylosing spondylitis. I will check x-rays of hands, feet, knees, and SI joint along with a MRI of her SI joint and labs. I recommend relaxation strategies for her MRI because she is nervous about it. I will not change her treatment at this time until I receive the results of these tests due to the unknown severity of her disease. I mentioned possibly starting methotrexate or a biologic pending her results  For now, she should continue with Prednisone 20 mg daily. I explained that once we begin treatment with methotrexate or a biologic, we will begin to taper prednisone during which I will have her start Naproxen 500 mg BID. I will provide her with Zantac 120 mg daily for her heartburn from prednisone usage. She should return in 2 weeks for a followup unless otherwise directed. PLAN  1. X-rays of hands, feet, knees to look for inflammatory/erosive changes   2. Sacroiliac x-ray to look for inflammation/sclerosis/erosive changes  3. MRI SI joint  4. CCP, HBLA-27, Hep B, Hep C, TB  5. Prednisone 20 mg daily; Zantac 120 mg daily  6. Naproxen 500 mg BID when starting Prednisone taper  7. Return in 2 weeks      Drake Cronin MD  Adult and Pediatric Rheumatology     [facility], [address], Phone [phone], Fax [phone]     Visiting  of Pediatrics    Department of Pediatrics, [facility] of [address] [place] [address], [address] [place] [address], Phone [phone], Fax [phone]    There are no Patient Instructions on file for this visit. cc:  Meredith Davenport MD    Written by conner Leary, as dictated by Sima Giron.  Tonya Cronin M.D.

## 2017-09-03 LAB
ANNOTATION COMMENT IMP: NORMAL
GAMMA INTERFERON BACKGROUND BLD IA-ACNC: 0.02 IU/ML
M TB IFN-G BLD-IMP: NEGATIVE
M TB IFN-G CD4+ BCKGRND COR BLD-ACNC: <0 IU/ML
M TB IFN-G CD4+ T-CELLS BLD-ACNC: 0.01 IU/ML
MITOGEN IGNF BLD-ACNC: 9.57 IU/ML
QUANTIFERON INCUBATION: NORMAL
SERVICE CMNT-IMP: NORMAL

## 2017-09-05 ENCOUNTER — TELEPHONE (OUTPATIENT)
Dept: OBGYN CLINIC | Age: 37
End: 2017-09-05

## 2017-09-05 LAB
CCP IGA+IGG SERPL IA-ACNC: 5 UNITS (ref 0–19)
HBV CORE AB SERPL QL IA: NEGATIVE
HBV SURFACE AB SER-ACNC: 73.4 MIU/ML
HBV SURFACE AG SERPL QL IA: NEGATIVE
HCV AB S/CO SERPL IA: <0.1 S/CO RATIO (ref 0–0.9)
HCV AB SERPL QL IA: NORMAL
HLA-B27 QL NAA+PROBE: NEGATIVE

## 2017-09-05 NOTE — TELEPHONE ENCOUNTER
Patient calling stating that she is scheduled for a MRI tomorrow and given valium to take. Patient has never taken this medication and not sure how it will make her feel and she has an AE tomorrow.   Patient advised that if she feels like she is not coherent enough to come to the appointment, she can call and R/S.

## 2017-09-06 ENCOUNTER — HOSPITAL ENCOUNTER (OUTPATIENT)
Dept: MRI IMAGING | Age: 37
Discharge: HOME | End: 2017-09-06
Attending: PEDIATRICS
Payer: COMMERCIAL

## 2017-09-06 DIAGNOSIS — M47.819 SPONDYLARTHRITIS: ICD-10-CM

## 2017-09-06 PROCEDURE — 72195 MRI PELVIS W/O DYE: CPT

## 2017-09-14 ENCOUNTER — OFFICE VISIT (OUTPATIENT)
Dept: RHEUMATOLOGY | Age: 37
End: 2017-09-14

## 2017-09-14 VITALS
OXYGEN SATURATION: 99 % | HEART RATE: 104 BPM | BODY MASS INDEX: 30.45 KG/M2 | RESPIRATION RATE: 20 BRPM | WEIGHT: 194 LBS | TEMPERATURE: 97.9 F | HEIGHT: 67 IN | SYSTOLIC BLOOD PRESSURE: 141 MMHG | DIASTOLIC BLOOD PRESSURE: 89 MMHG

## 2017-09-14 DIAGNOSIS — M45.0 ANKYLOSING SPONDYLITIS OF MULTIPLE SITES IN SPINE (HCC): Primary | ICD-10-CM

## 2017-09-14 RX ORDER — SULFASALAZINE 500 MG/1
500 TABLET, DELAYED RELEASE ORAL 2 TIMES DAILY
Qty: 60 TAB | Refills: 6 | Status: SHIPPED | OUTPATIENT
Start: 2017-09-14

## 2017-09-14 RX ORDER — PREDNISONE 20 MG/1
10 TABLET ORAL 2 TIMES DAILY
COMMUNITY

## 2017-09-14 NOTE — MR AVS SNAPSHOT
Visit Information Date & Time Provider Department Dept. Phone Encounter #  
 9/14/2017 10:30 AM Vielka Locke MD [address] [phone] [id number] Follow-up Instructions Return in about 6 weeks (around 10/26/2017). Upcoming Health Maintenance Date Due DTaP/Tdap/Td series (1 - Tdap) 1/17/2003 INFLUENZA AGE 9 TO ADULT 8/1/2017 PAP AKA CERVICAL CYTOLOGY 7/1/2019 Allergies as of 9/14/2017  Review Complete On: 9/14/2017 By: Yudith Bello LPN Severity Noted Reaction Type Reactions Doxycycline  08/13/2015   Side Effect Other (comments) esophagitis Iodinated Contrast- Oral And Iv Dye  04/03/2014    Unknown (comments) Pt states she passed out. Morphine  04/03/2014    Rash, Itching Tramadol  04/03/2014    Rash, Itching Current Immunizations  Reviewed on 7/28/2017 Name Date  
 TB Skin Test (PPD) Intradermal  Incomplete, 7/28/2017  9:33 AM, 7/25/2016, 7/13/2016, 8/24/2015, 8/14/2015 Not reviewed this visit You Were Diagnosed With   
  
 Codes Comments Ankylosing spondylitis of multiple sites in spine [facility])    -  Primary ICD-10-CM: M45.0 ICD-9-CM: 720.0 Vitals BP Pulse Temp Resp Height(growth percentile) Weight(growth percentile) 141/89 (BP 1 Location: Left arm, BP Patient Position: Sitting) (!) 104 97.9 °F (36.6 °C) (Oral) 20 5' 7\" (1.702 m) 194 lb (88 kg) LMP SpO2 BMI OB Status Smoking Status 08/23/2017 99% 30.38 kg/m2 Having regular periods Former Smoker BMI and BSA Data Body Mass Index Body Surface Area  
 30.38 kg/m 2 2.04 m 2 Preferred Pharmacy Pharmacy Name Phone [facility], [facility] [phone] Your Updated Medication List  
  
   
This list is accurate as of: 9/14/17 11:08 AM.  Always use your most recent med list.  
  
  
  
  
 ethinyl estradiol-etonogestrel 0.12-0.015 mg/24 hr vaginal ring Commonly known as:  Sheremmanuel Calender Insert ring vaginally for four weeks, remove for four days. Replace with new ring. ibuprofen 200 mg tablet Commonly known as:  MOTRIN Take 800 mg by mouth daily. levonorgestrel-ethinyl estradiol 0.1-20 mg-mcg Tab Commonly known as:  Anatoly Maksim (28) Take 1 Tab by mouth daily. naproxen 500 mg tablet Commonly known as:  NAPROSYN Take 1 Tab by mouth two (2) times daily (with meals) for 30 days. predniSONE 20 mg tablet Commonly known as:  Garrel Moron Take 10 mg by mouth two (2) times a day. raNITIdine 150 mg tablet Commonly known as:  ZANTAC Take 1 Tab by mouth two (2) times a day. sulfaSALAzine  mg EC tablet Commonly known as:  AZULFIDINE Take 1 Tab by mouth two (2) times a day. TYLENOL PO Take 1,000 mg by mouth. Prescriptions Sent to Pharmacy Refills  
 sulfaSALAzine EC (AZULFIDINE) 500 mg EC tablet 6 Sig: Take 1 Tab by mouth two (2) times a day. Class: Normal  
 Pharmacy: [facility] [address] [place] [address] #: [phone] Route: Oral  
  
We Performed the Following REFERRAL TO OPHTHALMOLOGY [REF57 Custom] Comments:  
 Dr. Meagan Maya for Plaquenil toxicity - [phone] Martha Colby for Plaquenil toxicity - [phone] Dr. Griselda Lu for Plaquenil toxicity - [phone] Follow-up Instructions Return in about 6 weeks (around 10/26/2017). Referral Information Referral ID Referred By Referred To  
  
 [id number] [facility] [address] Carmen Soria, [address] Phone: [phone] Fax: [phone] Visits Status Start Date End Date 1 New Request 9/14/17 9/14/18  If your referral has a status of pending review or denied, additional information will be sent to support the outcome of this decision. Introducing [facility]! Dear Demetrius Parra: Thank you for requesting a [employer] account. Our records indicate that you already have an active [employer] account. You can access your account anytime at [email or web address] Did you know that you can access your hospital and ER discharge instructions at any time in [employer]? You can also review all of your test results from your hospital stay or ER visit. Additional Information If you have questions, please visit the Frequently Asked Questions section of the [employer] website at [email or web address] Remember, [employer] is NOT to be used for urgent needs. For medical emergencies, dial 911. Now available from your iPhone and Android! Please provide this summary of care documentation to your next provider. Your primary care clinician is listed as Sana Liu. If you have any questions after today's visit, please call [phone].

## 2017-09-14 NOTE — PROGRESS NOTES
RHEUMATOLOGY PROBLEM LIST AND CHIEF COMPLAINT  1. Ankylosing Spondylitis (2017) - Elevated CRP, Response to steroids, joint pain, morning stiffness, positive Art's    INTERVAL HISTORY  Ms. Serjio York is a 30 y.o.  female who returns for follow up. We discussed the study results in detail. The patient complains of adverse effects to prednisone (blurred vision, tachycardia, chest pain, difficulty sleeping). She continues with prednisone 20 mg daily and naproxen 500 mg BID. She continues to experience joint symptoms on this medication. She is worried about possible side effects to the medications. PHYSICAL EXAM  Patient not fully examined; the patient is here to review lab studies, radiologic studies and discuss management and treatment. LABS, RADIOLOGY AND PROCEDURES - Previous available labs, radiology and procedures were reviewed in detail with the patient. The patient was counseled on the labs that were ordered and the meaning of positive and negative results and any disease implication that these labs may have. X-RAY B/l Hands  Show mild effusions    X-RAY SI Joint  Shows Edema    XRAY Right Foot  IMPRESSION:    Minimal degenerative changes first MTP joint  Mild OA changes    ASSESSMENT  1. Ankylosing Spondylitis (Established problem -  Progressive disease) - I explained the diagnosis to the patient and the efficacy and safety of the treatment plan. I will give her handouts and want the patient to research the medications. I want her to contact me once she has made her decision. For now, I want her to start sulfasalazine 500 mg BID. She should continue with prednisone 20 mg daily. I recommend that she take her dose in the morning instead of BID as it may be causing her sleep-related issues. I explained that once she starts her biologic and sulfasalazine, we can begin to taper her prednisone. I will refer her to ophthalmology for evaluation of her blurry vision.  She should return in 6 weeks for a follow up. 2. New medication - Sulfasalzine - A written summary, as prepared by the [facility] of Rheumatology was provided. The patient was given the opportunity to ask questions, and verbalized understanding of the following: The most common side effect reported is nausea and adominal pain (33% of patients). These both improve with time. Rash and HA can also occur but are less common. Abnormal LFT's, mouth sores and pruritis are even rarer but can occur. We encourage the use of sunblock due to burning or skin damage that can occur with the medication. Orange skin and urine can occur but is harmless. Blood count abnormalities can occur (low WBC leading to more infections) and anemia in those with G6PD deficiency. This drug is safe in pregnancy but does decrease levels of folate which is important for pregnancy. It should not be used during lactation. 3. New medication - Anti-TNF therapy (humira) - A written summary, as prepared by the [facility] [place] of Rheumatology was provided. The patient was given the opportunity to ask questions, and verbalized understanding of the following: The most common side effect seen with the injectable drugs (adalimumab) are skin reactions, commonly referred to as \"injection site reactions. \"  This can last up to 1 week. The most significant side effects of anti-TNF therapy are increased risk for all types of infections, including TB and fungal infections. Some of these infections may be severe. The patient will be tested for TB and hepatitis prior to starting therapy. The medication should be stopped if there is high fever or if the patient is being treated with antibiotics for an infection. Long term anti-TNF agents may increase the risk of cancers such as lymphoma and skin cancer. There are rare neurologic complications from the use of these medications. People who have a history of multiple sclerosis should not use them.  People with significant heart failure should not be on anti-TNF therapy. Using these medications may make the vaccination less effective and live vaccines should not be given. 4. New medication - enbrel - A written summary, as prepared by the [facility] of Rheumatology was provided. The patient was given the opportunity to ask questions, and verbalized understanding of the following: The most common side effect seen with the injectable drugs (etanercept) are skin reactions, commonly referred to as \"injection site reactions. \"  This can last up to 1 week. The most significant side effects of anti-TNF therapy are increased risk for all types of infections, including TB and fungal infections. Some of these infections may be severe. The patient is tested for TB and hepatitis prior to starting therapy. The medication should be stopped if there is high fever or if the patient is being treated with antibiotics for an infection. Long term anti-TNF agents may increase the risk of cancers such as lymphoma and skin cancer. There are rare neurologic complications from the use of these medications. People who have a history of multiple sclerosis should not use them. People with significant heart failure should not be on anti-TNF therapy. Using these medications may make the vaccination less effective and live vaccines should not be given. 5. New medication - Remicade  - A written summary, as prepared by the [facility] of Rheumatology was provided. The patient was given the opportunity to ask questions, and verbalized understanding of the following: The infusion therapy has been associated with a severe allergic reaction with swelling of the lips, difficulty breathing and low blood pressure. The most significant side effects of anti-TNF therapy are increased risk for all types of infections, including TB and fungal infections. Some of these infections may be severe.   The patient will be tested for TB and hepatitis prior to starting therapy. The medication should be stopped if there is high fever or if the patient is being treated with antibiotics for an infection. Long term anti-TNF agents may increase the risk of cancers such as lymphoma and skin cancer. There are rare neurologic complications from the use of these medications. People who have a history of multiple sclerosis should not use them. People with significant heart failure should not be on anti-TNF therapy. Using these medications may make the vaccination less effective and live vaccines should not be given. PLAN  1. Sulfasalazine 500 mg BID   2. Humira, Enbrel or Remicade pending patient's decision  3. Prednisone 20 mg daily  4. Referral to ophthalmology  5. Return in 6 weeks     Total face-to face time was 40 minutes, greater than 50% of which was spent in counseling and coordination of care. The diagnosis, treatment and various other items were discussed in detail: Test results, medication options, possible side effects, lifestyle changes. Drake Boyd MD  Adult and Pediatric Rheumatology     [facility] [address], Phone [phone], Fax [phone]     Visiting  of Pediatrics    Department of Pediatrics, [facility] of [address] [place] [address], [address] [place] [address], Phone [phone], Fax [phone]    There are no Patient Instructions on file for this visit. cc:  Sana Liu MD    Written by conner Armando, as dictated by Tahir Cuevas.  Hui Boyd M.D.

## 2017-09-29 ENCOUNTER — OFFICE VISIT (OUTPATIENT)
Dept: FAMILY MEDICINE CLINIC | Age: 37
End: 2017-09-29

## 2017-09-29 VITALS
RESPIRATION RATE: 20 BRPM | WEIGHT: 194.2 LBS | OXYGEN SATURATION: 98 % | SYSTOLIC BLOOD PRESSURE: 109 MMHG | DIASTOLIC BLOOD PRESSURE: 66 MMHG | TEMPERATURE: 97.6 F | HEIGHT: 67 IN | HEART RATE: 93 BPM | BODY MASS INDEX: 30.48 KG/M2

## 2017-09-29 DIAGNOSIS — J40 BRONCHITIS: Primary | ICD-10-CM

## 2017-09-29 DIAGNOSIS — J01.90 SUBACUTE SINUSITIS, UNSPECIFIED LOCATION: ICD-10-CM

## 2017-09-29 PROBLEM — M45.0 ANKYLOSING SPONDYLITIS OF MULTIPLE SITES IN SPINE (HCC): Status: ACTIVE | Noted: 2017-09-29

## 2017-09-29 RX ORDER — AMOXICILLIN AND CLAVULANATE POTASSIUM 875; 125 MG/1; MG/1
1 TABLET, FILM COATED ORAL 2 TIMES DAILY
Qty: 20 TAB | Refills: 0 | Status: SHIPPED | OUTPATIENT
Start: 2017-09-29 | End: 2017-10-09

## 2017-09-29 RX ORDER — FLUTICASONE PROPIONATE 50 MCG
SPRAY, SUSPENSION (ML) NASAL
Refills: 0 | COMMUNITY
Start: 2017-09-23 | End: 2017-09-29 | Stop reason: ALTCHOICE

## 2017-09-29 RX ORDER — BENZONATATE 100 MG/1
CAPSULE ORAL
Refills: 0 | COMMUNITY
Start: 2017-09-23

## 2017-09-29 RX ORDER — ALBUTEROL SULFATE 90 UG/1
2 AEROSOL, METERED RESPIRATORY (INHALATION)
Qty: 1 INHALER | Refills: 0 | Status: SHIPPED | OUTPATIENT
Start: 2017-09-29

## 2017-09-29 RX ORDER — PSEUDOEPHEDRINE HYDROCHLORIDE 120 MG/1
TABLET, FILM COATED, EXTENDED RELEASE ORAL
Refills: 0 | COMMUNITY
Start: 2017-09-23 | End: 2017-09-29 | Stop reason: ALTCHOICE

## 2017-09-29 NOTE — MR AVS SNAPSHOT
Visit Information Date & Time Provider Department Dept. Phone Encounter #  
 [id number] 27:40 AM Violet Favre, [place] [phone] [id number] Upcoming Health Maintenance Date Due  
 PAP AKA CERVICAL CYTOLOGY 7/1/2019 DTaP/Tdap/Td series (2 - Td) 9/29/2027 Allergies as of 9/29/2017  Review Complete On: 0/68/5371 By: Olivia Favre, MD  
  
 Severity Noted Reaction Type Reactions Doxycycline  08/13/2015   Side Effect Other (comments) esophagitis Iodinated Contrast- Oral And Iv Dye  04/03/2014    Unknown (comments) Pt states she passed out. Morphine  04/03/2014    Rash, Itching Tramadol  04/03/2014    Rash, Itching Current Immunizations  Reviewed on 7/28/2017 Name Date  
 TB Skin Test (PPD) Intradermal  Incomplete, 7/28/2017  9:33 AM, 7/25/2016, 7/13/2016, 8/24/2015, 8/14/2015 Not reviewed this visit You Were Diagnosed With   
  
 Codes Comments Bronchitis    -  Primary ICD-10-CM: E32 ICD-9-CM: 547 Subacute sinusitis, unspecified location     ICD-10-CM: J01.90 ICD-9-CM: 461.9 Vitals BP Pulse Temp Resp Height(growth percentile) Weight(growth percentile) 109/66 (BP 1 Location: Right arm, BP Patient Position: Sitting) 93 97.6 °F (36.4 °C) (Oral) 20 5' 7\" (1.702 m) 194 lb 3.2 oz (88.1 kg) LMP SpO2 BMI OB Status Smoking Status 09/22/2017 98% 30.42 kg/m2 Having regular periods Former Smoker Vitals History BMI and BSA Data Body Mass Index Body Surface Area  
 30.42 kg/m 2 2.04 m 2 Preferred Pharmacy Pharmacy Name Phone [facility] [address] [facility] [phone] Your Updated Medication List  
  
   
This list is accurate as of: 9/29/17 12:03 PM.  Always use your most recent med list.  
  
  
  
  
 albuterol 90 mcg/actuation inhaler Commonly known as:  VENTOLIN HFA  
 Take 2 Puffs by inhalation every four (4) hours as needed for Wheezing. amoxicillin-clavulanate 875-125 mg per tablet Commonly known as:  AUGMENTIN Take 1 Tab by mouth two (2) times a day for 10 days. benzonatate 100 mg capsule Commonly known as:  TESSALON  
take 1 capsule by mouth three times a day  
  
 ibuprofen 200 mg tablet Commonly known as:  MOTRIN Take 800 mg by mouth daily. levonorgestrel-ethinyl estradiol 0.1-20 mg-mcg Tab Commonly known as:  Bardolph Alexia (28) Take 1 Tab by mouth daily. naproxen 500 mg tablet Commonly known as:  NAPROSYN Take 1 Tab by mouth two (2) times daily (with meals) for 30 days. predniSONE 20 mg tablet Commonly known as:  Yuan Jabs Take 10 mg by mouth two (2) times a day. sulfaSALAzine  mg EC tablet Commonly known as:  AZULFIDINE Take 1 Tab by mouth two (2) times a day. TYLENOL PO Take 1,000 mg by mouth. Prescriptions Printed Refills  
 amoxicillin-clavulanate (AUGMENTIN) 875-125 mg per tablet 0 Sig: Take 1 Tab by mouth two (2) times a day for 10 days. Class: Print Route: Oral  
 albuterol (VENTOLIN HFA) 90 mcg/actuation inhaler 0 Sig: Take 2 Puffs by inhalation every four (4) hours as needed for Wheezing. Class: Print Route: Inhalation Introducing [facility]! Dear Aniket Sepulveda: Thank you for requesting a [facility] account. Our records indicate that you already have an active [facility] account. You can access your account anytime at [email or web address] Did you know that you can access your hospital and ER discharge instructions at any time in [facility]? You can also review all of your test results from your hospital stay or ER visit. Additional Information If you have questions, please visit the Frequently Asked Questions section of the [facility] website at [email or web address] Remember, MyChart is NOT to be used for urgent needs. For medical emergencies, dial 911. Now available from your iPhone and Android! Please provide this summary of care documentation to your next provider. Your primary care clinician is listed as Payton Tang. If you have any questions after today's visit, please call [phone].

## 2017-09-29 NOTE — PROGRESS NOTES
Subjective:     MR is a 29 yo female presenting for unresolved bronchitis with symptoms that have not completely resolved. Pt reports that she went to [facility] 1 week ago, got a z pack, tessalon pearls, and flonase for bilat ear infections and acute bronchitis. However, Symptoms have been going on for about 2 weeks. Main complaints are that \" Breathing is hard, like breathing through a straw\", SOB, some congestion, no wheezing, chest tightness, not really coughing, but phlegm is green and yellow still. 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 Complaining of ankle swelling, both sides. Especially after standing all day at clinical yesterday. Also complained of diffuse redness on her chest and face that occurs and resolves spontaneously. Also complains of the \"worst headache\" this morning. Reports the pain was all over her head. Sulfalazine started on Sept. 14th. 1000mg/day for joint pain. Stopped taking it 2 days ago. Not taking prednisone, weaned herself off. ROS: See HPI for pertinent ROS    Objective:  Visit Vitals    /66 (BP 1 Location: Right arm, BP Patient Position: Sitting)    Pulse 93    Temp 97.6 °F (36.4 °C) (Oral)    Resp 20    Ht 5' 7\" (1.702 m)    Wt 194 lb 3.2 oz (88.1 kg)    SpO2 98%    BMI 30.42 kg/m2     General: alert, oriented, appears well developed and well nourished. HEENT: Head- AT/NC, Ears: TM pearly gray, canals clear bilat, Throat: buccal mucosa pink, no erythema or exudate, Neck: supple, no lymphadenopathy  CV/PV: S1S2 normal, RRR, no murmurs, rubs, gallops.  Cap refills <2 sec, radial pulses 2+ equal bilat, lower extremities, very trace swelling, non pitting, equal bilat  Resp: lungs clear to auscultation except for wheezing left upper lobe      Plan:  Bronchitis:  - Augmentin 875-125mg BID for 10 days  - Ventolin HFA 2 puffs, q4 hour    Ankylosing spondylitis:  - spoke to patient about using diet to control inflammatory symptoms; patient very adamant about not taking medications (prednisone and sulfalazine). Told patient we could refer to another rheumatologist if did not want to see Dr. Jasiel Hull. Told patient it may take a long time before inflammatory symptoms resolve; if do not resolve need to take medications.

## 2017-09-29 NOTE — PROGRESS NOTES
Identified pt with two pt identifiers(name and ). Chief Complaint   Patient presents with    Sinus Infection     she has had almost 2 weeks - went to [facility] last sat     Medication Evaluation     Dr Jacqueline Lynn started her on new meds 17    Cough     she has had for alsost 2 weeks     Swelling     she states swelling started after new med    Breathing Problem     she can feel wheezing but cant hear it        There are no preventive care reminders to display for this patient. Wt Readings from Last 3 Encounters:   17 194 lb 3.2 oz (88.1 kg)   17 194 lb (88 kg)   17 192 lb 12.8 oz (87.5 kg)     Temp Readings from Last 3 Encounters:   17 97.6 °F (36.4 °C) (Oral)   17 97.9 °F (36.6 °C) (Oral)   17 98.6 °F (37 °C) (Oral)     BP Readings from Last 3 Encounters:   17 109/66   17 141/89   17 125/84     Pulse Readings from Last 3 Encounters:   17 93   17 (!) 104   17 83         Learning Assessment:  :     Learning Assessment 2015   PRIMARY LEARNER Patient   HIGHEST LEVEL OF EDUCATION - PRIMARY LEARNER  > 4 YEARS OF COLLEGE   BARRIERS PRIMARY LEARNER NONE   CO-LEARNER CAREGIVER No   PRIMARY LANGUAGE ENGLISH   LEARNER PREFERENCE PRIMARY PICTURES   ANSWERED BY self   RELATIONSHIP SELF       Depression Screening:  :     PHQ over the last two weeks 2017   Little interest or pleasure in doing things Not at all   Feeling down, depressed or hopeless Not at all   Total Score PHQ 2 0       Fall Risk Assessment:  :     No flowsheet data found. Abuse Screening:  :     Abuse Screening Questionnaire 2017   Do you ever feel afraid of your partner? N N   Are you in a relationship with someone who physically or mentally threatens you? N N   Is it safe for you to go home? Y Y       Coordination of Care Questionnaire:  :     1) Have you been to an emergency room, urgent care clinic since your last visit?  yes [facility] last sat Hospitalized since your last visit? no             2) Have you seen or consulted any other health care providers outside of [address] [place] [address] since your last visit? yes  Dr Lesly Hathaway (Include any pap smears or colon screenings in this section.)    3) Do you have an Advance Directive on file? no  Are you interested in receiving information about Advance Directives? Yes paper work given and explained     Patient is accompanied by spouse I have received verbal consent from Hussain Ozuna to discuss any/all medical information while they are present in the room. Reviewed record in preparation for visit and have obtained necessary documentation. Medication reconciliation up to date and corrected with patient at this time.    Please review meds

## 2017-10-01 NOTE — PROGRESS NOTES
The patient was seen today with NP student Mateus Nettles.  I have reviewed this note and agree with accuracy of information and plan of management. Subjective:     MR is a 31 yo female presenting for unresolved bronchitis with symptoms that have not completely resolved. Pt reports that she went to [facility] 1 week ago, got a z pack, tessalon pearls, and flonase for bilat ear infections and acute bronchitis. However, Symptoms have been going on for about 2 weeks. Main complaints are that \" Breathing is hard, like breathing through a straw\", SOB, some congestion, no wheezing, chest tightness, not really coughing, but phlegm is green and yellow still. 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 Complaining of ankle swelling, both sides. Especially after standing all day at clinical yesterday. Also complained of diffuse redness on her chest and face that occurs and resolves spontaneously. Also complains of the \"worst headache\" this morning. Reports the pain was all over her head. Sulfalazine started on Sept. 14th. 1000mg/day for joint pain. Stopped taking it 2 days ago. Not taking prednisone, weaned herself off. Pt has been seen by Rheumatology Dr. Joel Woodward for AS. She does not want to take recommended medicines and weaned off prednisone. Plans to follow anti-inflammatory diet to ease joint pains. ROS: See HPI for pertinent ROS    Objective:  Visit Vitals    /66 (BP 1 Location: Right arm, BP Patient Position: Sitting)    Pulse 93    Temp 97.6 °F (36.4 °C) (Oral)    Resp 20    Ht 5' 7\" (1.702 m)    Wt 194 lb 3.2 oz (88.1 kg)    SpO2 98%    BMI 30.42 kg/m2     General: alert, oriented, appears well developed and well nourished.    HEENT: Head- AT/NC, Ears: TM pearly gray, canals clear bilat, Throat: buccal mucosa pink, no erythema or exudate, Neck: supple, no lymphadenopathy  CV/PV: S1S2 normal, RRR, no murmurs, rubs, gallops. Cap refills <2 sec, radial pulses 2+ equal bilat, lower extremities, very trace swelling, non pitting, equal bilat  Resp: lungs clear to auscultation except for wheezing left upper lobe      Plan:  Bronchitis:  - Augmentin 875-125mg BID for 10 days  - Ventolin HFA 2 puffs, q4 hour    Ankylosing spondylitis:  - spoke to patient about using diet to control inflammatory symptoms; patient very adamant about not taking medications (prednisone and sulfalazine). Told patient we could refer to another rheumatologist if did not want to see Dr. Arvin Denver. Told patient it may take a long time before inflammatory symptoms resolve; if do not resolve need to take medications. 1. Bronchitis    2. Subacute sinusitis, unspecified location       Orders Placed This Encounter    benzonatate (TESSALON) 100 mg capsule     Sig: take 1 capsule by mouth three times a day     Refill:  0    DISCONTD: fluticasone (FLONASE) 50 mcg/actuation nasal spray     Sig: spray 1 spray into each nostril once daily     Refill:  0    DISCONTD: SUDAFED 12 HOUR 120 mg CR tablet     Sig: take 1 tablet by mouth every 12 hours     Refill:  0    amoxicillin-clavulanate (AUGMENTIN) 875-125 mg per tablet     Sig: Take 1 Tab by mouth two (2) times a day for 10 days. Dispense:  20 Tab     Refill:  0    albuterol (VENTOLIN HFA) 90 mcg/actuation inhaler     Sig: Take 2 Puffs by inhalation every four (4) hours as needed for Wheezing.      Dispense:  1 Inhaler     Refill:  0

## 2017-10-16 ENCOUNTER — TELEPHONE (OUTPATIENT)
Dept: OBGYN CLINIC | Age: 37
End: 2017-10-16

## 2017-10-16 RX ORDER — LEVONORGESTREL AND ETHINYL ESTRADIOL 0.1-0.02MG
1 KIT ORAL DAILY
Qty: 1 PACKAGE | Refills: 0 | Status: SHIPPED | OUTPATIENT
Start: 2017-10-16 | End: 2017-10-23 | Stop reason: SDUPTHER

## 2017-10-16 NOTE — TELEPHONE ENCOUNTER
Patient R/S AE for 10/23/17. Patient started her period and had to R/S. Patient aware that rx was sent.

## 2017-10-23 ENCOUNTER — OFFICE VISIT (OUTPATIENT)
Dept: OBGYN CLINIC | Age: 37
End: 2017-10-23

## 2017-10-23 VITALS
SYSTOLIC BLOOD PRESSURE: 110 MMHG | DIASTOLIC BLOOD PRESSURE: 80 MMHG | HEIGHT: 67 IN | WEIGHT: 189 LBS | BODY MASS INDEX: 29.66 KG/M2

## 2017-10-23 DIAGNOSIS — N94.6 DYSMENORRHEA: ICD-10-CM

## 2017-10-23 DIAGNOSIS — Z01.419 ENCOUNTER FOR GYNECOLOGICAL EXAMINATION WITHOUT ABNORMAL FINDING: Primary | ICD-10-CM

## 2017-10-23 DIAGNOSIS — N92.0 MENORRHAGIA WITH REGULAR CYCLE: ICD-10-CM

## 2017-10-23 DIAGNOSIS — Z23 ENCOUNTER FOR IMMUNIZATION: ICD-10-CM

## 2017-10-23 RX ORDER — LEVONORGESTREL AND ETHINYL ESTRADIOL 0.1-0.02MG
1 KIT ORAL DAILY
Qty: 3 PACKAGE | Refills: 4 | Status: SHIPPED | OUTPATIENT
Start: 2017-10-23 | End: 2018-10-12 | Stop reason: SDUPTHER

## 2017-10-23 NOTE — PROGRESS NOTES
Annual exam/Problem visit/ ages 23-44    Oren Jacob is a ,  30 y.o. female [facility] Patient's last menstrual period was 10/16/2017. .    She presents for her annual checkup. A couple problems:  See below    With regard to the Gardasil vaccine, she is older than the FDA approved age to receive it. Menstrual status:    Her periods are heavy in flow. She is using one to two pads or tampons per day, usually regular and last 26-30 days. She denies dysmenorrhea. She reports no premenstrual symptoms. Contraception:    The current method of family planning is OCP (estrogen/progesterone). Sexual history:     She  reports that she currently engages in sexual activity and has had male partners. She reports using the following method of birth control/protection: Pill. Medical conditions:    Since her last annual GYN exam about one year ago, she has not the following changes in her health history: She also was recently diagnosed with spondylitis and osteoporosis of the spine. .     Pap and Mammogram History:    Her most recent Pap smear was normal, obtained 1 year(s) ago. The patient had a recent mammogram on 17 which was negative for malignancy.     Breast Cancer History/Substance Abuse: positive breast cancer in mother, maternal and paternal grandmothers, maternal great grandmother and 2 maternal aunts    Past Medical History:   Diagnosis Date    Condyloma     IBS (irritable bowel syndrome)     Osteoporosis     Pelvic floor dysfunction     Routine Papanicolaou smear 16 neg HPV neg    Screening mammogram for high-risk patient     Spondylitis [facility])      Past Surgical History:   Procedure Laterality Date    HX APPENDECTOMY      HX  SECTION      HX COLONOSCOPY      Dr. Kayleigh Berrios - Calin Yañez HX TYMPANOSTOMY         Current Outpatient Prescriptions   Medication Sig Dispense Refill    levonorgestrel-ethinyl estradiol (LUTERA, 28,) 0.1-20 mg-mcg tab Take 1 Tab by mouth daily. 1 Package 0    benzonatate (TESSALON) 100 mg capsule take 1 capsule by mouth three times a day  0    albuterol (VENTOLIN HFA) 90 mcg/actuation inhaler Take 2 Puffs by inhalation every four (4) hours as needed for Wheezing. 1 Inhaler 0    predniSONE (DELTASONE) 20 mg tablet Take 10 mg by mouth two (2) times a day.  sulfaSALAzine EC (AZULFIDINE) 500 mg EC tablet Take 1 Tab by mouth two (2) times a day. 60 Tab 6    ACETAMINOPHEN (TYLENOL PO) Take 1,000 mg by mouth.  ibuprofen (MOTRIN) 200 mg tablet Take 800 mg by mouth daily. Allergies: Doxycycline; Iodinated contrast- oral and iv dye; Morphine; and Tramadol     Tobacco History:  reports that she has quit smoking. She has never used smokeless tobacco.  Alcohol Abuse:  reports that she drinks about 0.6 oz of alcohol per week   Drug Abuse:  reports that she does not use illicit drugs.     Family Medical/Cancer History:   Family History   Problem Relation Age of Onset   26 [facility] Breast Cancer Mother 36    Hypertension Mother     Diabetes Mother     Breast Cancer Paternal Grandmother     Cancer Father      lung cancer    Cancer Paternal Grandfather      brain cancer    Breast Cancer Maternal Grandmother     Heart Failure Maternal Grandmother     Diabetes Maternal Grandmother     Breast Cancer Maternal Aunt      x2    Colon Cancer Maternal Aunt     Colon Cancer Other      Aunt    Heart Failure Maternal Grandfather     Diabetes Maternal Grandfather     Colon Cancer Maternal Uncle         Review of Systems - History obtained from the patient  Constitutional: negative for weight loss, fever, night sweats  HEENT: negative for hearing loss, earache, congestion, snoring, sorethroat  CV: negative for chest pain, palpitations, edema  Resp: negative for cough, shortness of breath, wheezing  GI: negative for change in bowel habits, abdominal pain, black or bloody stools  : negative for frequency, dysuria, hematuria, vaginal discharge  MSK: negative for back pain, joint pain, muscle pain  Breast: negative for breast lumps, nipple discharge, galactorrhea  Skin :negative for itching, rash, hives  Neuro: negative for dizziness, headache, confusion, weakness  Psych: negative for anxiety, depression, change in mood  Heme/lymph: negative for bleeding, bruising, pallor    Physical Exam    Visit Vitals    /80    Ht 5' 7\" (1.702 m)    Wt 189 lb (85.7 kg)    LMP 10/16/2017    BMI 29.6 kg/m2       Constitutional  · Appearance: well-nourished, well developed, alert, in no acute distress    HENT  · Head and Face: appears normal    Neck  · Inspection/Palpation: normal appearance, no masses or tenderness  · Lymph Nodes: no lymphadenopathy present  · Thyroid: gland size normal, nontender, no nodules or masses present on palpation    Chest  · Respiratory Effort: breathing unlabored  · Auscultation: normal breath sounds    Cardiovascular  · Heart:  · Auscultation: regular rate and rhythm without murmur    Breasts  · Inspection of Breasts: breasts symmetrical, no skin changes, no discharge present, nipple appearance normal, no skin retraction present  · Palpation of Breasts and Axillae: no masses present on palpation, no breast tenderness  · Axillary Lymph Nodes: no lymphadenopathy present    Gastrointestinal  · Abdominal Examination: abdomen non-tender to palpation, normal bowel sounds, no masses present  · Liver and spleen: no hepatomegaly present, spleen not palpable  · Hernias: no hernias identified    Genitourinary  · External Genitalia: normal appearance for age, no discharge present, no tenderness present, no inflammatory lesions present, no masses present, no atrophy present  · Vagina: normal vaginal vault without central or paravaginal defects, no discharge present, no inflammatory lesions present, no masses present  · Bladder: non-tender to palpation  · Urethra: appears normal  · Cervix: normal   · Uterus: normal size, shape and consistency  · Adnexa: no adnexal tenderness present, no adnexal masses present  · Perineum: perineum within normal limits, no evidence of trauma, no rashes or skin lesions present  · Anus: anus within normal limits, no hemorrhoids present  · Inguinal Lymph Nodes: no lymphadenopathy present    Skin  · General Inspection: no rash, no lesions identified    Neurologic/Psychiatric  · Mental Status:  · Orientation: grossly oriented to person, place and time  · Mood and Affect: mood normal, affect appropriate    . Assessment:  Routine gynecologic examination  Her overall medical status is satisfactory except for gynecologic issues as below. Plan:  Counseled re: diet, exercise, healthy lifestyle  Return for yearly wellness visits  Gardisil counseling provided  Pt counseled regarding co-testing for high risk HPV with pap  Rec screening mammo at either 37 or 42    Problem visit    Subjective:  She is having upper left breast pain. She was advised this was just extra breast tissue. Negative mammo   She would like to discuss having an ablation due to heavy menses. Was off the pill for a couple months and everything was worse--flow and cramps. Menses last 7days total. Days 2-4 are particularly heavy, in spite of OCP    Objective:  See above    Assessment:  Normal exam.  Menorrhagia and dysmenorrhea in spite of OCP  Wants to have endometrial ablation and continue on OCP  Discussed need to not get pregnant after ablation    Plan:  RTO for US and endometrial bx and schedule [facility].

## 2017-10-23 NOTE — PROGRESS NOTES
After obtaining consent, and per orders of Dr. Ever Fernandez, injection of the Influenza Vaccine given in right deltoid by Daya Sheriff LPN. Patient instructed to remain in clinic for 20 minutes afterwards, and to report any adverse reaction to me immediately.

## 2018-01-08 ENCOUNTER — TELEPHONE (OUTPATIENT)
Dept: FAMILY MEDICINE CLINIC | Age: 38
End: 2018-01-08

## 2018-01-08 NOTE — TELEPHONE ENCOUNTER
Yes, let her know she is negative for Hep B surface antigen but she is immune to Hep B with positive Hep B surface antibody. She was probably immunized against Hep B. Does she need documentation of this printed out?

## 2018-01-08 NOTE — TELEPHONE ENCOUNTER
Returned call to pt and LM advising, Dr. Abdullahi Carroll has not drawn Hepatitis labs on you in the past, however, Dr. John Lake MD did do Hepatitis B labs on 08/30/17 which were normal/negative.

## 2018-01-08 NOTE — TELEPHONE ENCOUNTER
Patient thought that she had Hep titers drawn by Dr. Miguel Hagan in the last 5 years. I could not find anything in her chart unless I was overlooking it. Please call to advise her.   [phone]

## 2018-01-09 NOTE — TELEPHONE ENCOUNTER
Pt was advised. She does not need a copy of the labs and found that she had the titer testing for hepatitis done with another clinic.

## 2019-09-25 PROBLEM — Z11.1 SCREENING-PULMONARY TB: Status: RESOLVED | Noted: 2017-07-31 | Resolved: 2019-09-25
